# Patient Record
(demographics unavailable — no encounter records)

---

## 2024-10-23 NOTE — ASSESSMENT
[FreeTextEntry1] : In summary, Ms. JANAK FERNANDEZ is a 64-year-old postmenopausal female with stage IA (T1a, Nx, Mx) ER positive, MN positive, HER-2/neida negative invasive ductal carcinoma of the left breast. She is status post lumpectomy, ODX 0, RT completed, AI started 3/2024.   1. Breast Cancer- Ms. JANAK FERNANDEZ is tolerating AI well, good compliance. She has mild side effects from the treatment. Continue treatment x 5 yrs. Annual breast imaging reviewed: Last Diagnostic Mammogram and Bilateral Complete Breast US completed on 23 September 2024; BI-RADS 2.  2. Concern for worsening bone density and fractures due to Anastrozole. Last DEXA Bone Density Screening was completed approximately 4 years ago, with results unknown to the patient. She does report a past history of taking oral medication for the results, unsure of the name of the medication. Discussed possible need for additional intervention based on test results; patient amenable. Patient is currently taking both Calcium and Vitamin D. Encouraged weight bearing exercises as tolerated; patient walks 3-4 miles daily.  3.  High cholesterol/CAD risk factors: Concern for worsening cholesterol/CAD risk factors due to Anastrozole.  Lipid profile annually. Life-style modifications d/w her.  	 RTC 6 m

## 2024-10-23 NOTE — HISTORY OF PRESENT ILLNESS
[de-identified] : Ms. JANAK FERNANDEZ is a 63 year old female here for an evaluation of breast cancer. Her oncologic history is as follows:  Bilateral mammogram 9/26/23- left breast focal asymmetry (BIRADS 0) Diagnostic left mammogram/sonogram 10/3/2023- left breast 2:00 N+7 4 mm nodule for which ultrasound-guided biopsy is recommended (BIRADS 4). Bx (10/12/23): A. 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 has a contrast allergy as well)  Surgical pathology (12/4/23): 1. Breast, left, 2 o'clock, lumpectomy: - Invasive moderately differentiated duct carcinoma, 4 mm (microscopic measurement). - Ductal carcinoma in situ, intermediate nuclear grade, cribriform type, also involving an intraductal papilloma, with focal extension to lobules. - Margins of the specimen, free of tumor. - No lymphovascular invasion identified. - Focal lobular carcinoma in situ, classic type. - Fibrocystic changes. - Prior biopsy site changes. - Unremarkable skin. - ER+/ID+/HER2 negative by [facility] 2. Breast, left, superior margin, excision:- Fibroadipose tissue, negative for carcinoma. 3. Breast, left, medial margin, excision:- Fatty breast tissue, negative for carcinoma. 4. Breast, left, inferior margin, excision:- Fibroadipose tissue, negative for carcinoma. 5. Breast, left, lateral margin, excision:- Adipose tissue, negative for carcinoma. 6. Breast, left, deep margin, excision:- Skeletal muscle, negative for carcinoma. 7. Breast, left, anterior margin, excision:- Fibroadipose tissue, negative for carcinoma.   Her PMH includes HLD and anxiety/depression that started when her mother was diagnosed with dementia. She has had episodes of anaphylaxis in the past while traveling so she always travels with prednisone and epinephrine pen. She has been evaluated in the past and the offending agent has never been identified. Last episode occurred 3 years ago while traveling.  PSH includes excision of a dermoid cyst and salpingectomy. She underwent 3 cycles of IVF in the past. She has twin boys. 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 Allergies to contrast and shellfish. No prior breast issues. No family history of breast cancer.  ODX 0   1/19/24: Takes AI daily, good compliance. She denies aches/pain, hot flashes, vag dryness, excessive fatigue, GI s/e, hair loss. She is active, no change in energy, wt or appetite.  mammogram -  9/2023, [facility] for next year DEXA-  baseline pending  [de-identified] : In summary, Ms. JANAK FERNANDEZ is a 64-year-old postmenopausal female with stage IA (T1a, Nx, Mx) ER positive, MO positive, HER-2/neida negative invasive ductal carcinoma of the left breast. She is status post lumpectomy, ODX: 0, RT completed 3/2024, AI 3/2024.   10/23/24: Patient continues to take Anastrozole with good daily efficacy. Patient denies any excessive side effects consistent with: arthralgias, hot flashes, vaginal dryness, hair thinning, GI s/e's, SOB, excessive fatigue and sleep or mood disturbances. Anastrozole renewed today to pharmacy on file. She is walking 3-4 miles each day. Cooking for her family now that she is retired as a . She has twins-- one of which just graduated from [employer] as a Psychiatrist. He is now living in [place] practicing locally. Her other son, works for [employer] as a  given he is bilingual.  Breast Health: Last Diagnostic Mammogram and Bilateral Complete Breast US completed on 23 September 2024; BI-RADS 2.  Bone Health: Last DEXA Bone Density Screening was completed approximately 4 years ago, with results unknown to the patient. She does report a past history of taking oral medication for the results, unsure of the name of the medication. Discussed possible need for additional intervention based on test results; patient amenable. Patient is currently taking both Calcium and Vitamin D. Encouraged weight bearing exercises as tolerated; patient walks 3-4 miles daily.  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 RTC: 6 Months

## 2024-10-23 NOTE — ASSESSMENT
[FreeTextEntry1] : In summary, Ms. JANAK FERNANDEZ is a 64-year-old postmenopausal female with stage IA (T1a, Nx, Mx) ER positive, VA positive, HER-2/neida negative invasive ductal carcinoma of the left breast. She is status post lumpectomy, ODX 0, RT completed, AI started 3/2024.   1. Breast Cancer- Ms. JANAK FERNANDEZ is tolerating AI well, good compliance. She has mild side effects from the treatment. Continue treatment x 5 yrs. Annual breast imaging reviewed: Last Diagnostic Mammogram and Bilateral Complete Breast US completed on 23 September 2024; BI-RADS 2.  2. Concern for worsening bone density and fractures due to Anastrozole. Last DEXA Bone Density Screening was completed approximately 4 years ago, with results unknown to the patient. She does report a past history of taking oral medication for the results, unsure of the name of the medication. Discussed possible need for additional intervention based on test results; patient amenable. Patient is currently taking both Calcium and Vitamin D. Encouraged weight bearing exercises as tolerated; patient walks 3-4 miles daily.  3.  High cholesterol/CAD risk factors: Concern for worsening cholesterol/CAD risk factors due to Anastrozole.  Lipid profile annually. Life-style modifications d/w her.  	 RTC 6 m

## 2024-10-23 NOTE — HISTORY OF PRESENT ILLNESS
[de-identified] : Ms. JANAK FERNANDEZ is a 63 year old female here for an evaluation of breast cancer. Her oncologic history is as follows:  Bilateral mammogram 9/26/23- left breast focal asymmetry (BIRADS 0) Diagnostic left mammogram/sonogram 10/3/2023- left breast 2:00 N+7 4 mm nodule for which ultrasound-guided biopsy is recommended (BIRADS 4). Bx (10/12/23): A. 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 has a contrast allergy as well)  Surgical pathology (12/4/23): 1. Breast, left, 2 o'clock, lumpectomy: - Invasive moderately differentiated duct carcinoma, 4 mm (microscopic measurement). - Ductal carcinoma in situ, intermediate nuclear grade, cribriform type, also involving an intraductal papilloma, with focal extension to lobules. - Margins of the specimen, free of tumor. - No lymphovascular invasion identified. - Focal lobular carcinoma in situ, classic type. - Fibrocystic changes. - Prior biopsy site changes. - Unremarkable skin. - ER+/KS+/HER2 negative by [facility] 2. Breast, left, superior margin, excision:- Fibroadipose tissue, negative for carcinoma. 3. Breast, left, medial margin, excision:- Fatty breast tissue, negative for carcinoma. 4. Breast, left, inferior margin, excision:- Fibroadipose tissue, negative for carcinoma. 5. Breast, left, lateral margin, excision:- Adipose tissue, negative for carcinoma. 6. Breast, left, deep margin, excision:- Skeletal muscle, negative for carcinoma. 7. Breast, left, anterior margin, excision:- Fibroadipose tissue, negative for carcinoma.   Her PMH includes HLD and anxiety/depression that started when her mother was diagnosed with dementia. She has had episodes of anaphylaxis in the past while traveling so she always travels with prednisone and epinephrine pen. She has been evaluated in the past and the offending agent has never been identified. Last episode occurred 3 years ago while traveling.  PSH includes excision of a dermoid cyst and salpingectomy. She underwent 3 cycles of IVF in the past. She has twin boys. 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 Allergies to contrast and shellfish. No prior breast issues. No family history of breast cancer.  ODX 0   1/19/24: Takes AI daily, good compliance. She denies aches/pain, hot flashes, vag dryness, excessive fatigue, GI s/e, hair loss. She is active, no change in energy, wt or appetite.  mammogram -  9/2023, [facility] for next year DEXA-  baseline pending  [de-identified] : In summary, Ms. JANAK FERNANDEZ is a 64-year-old postmenopausal female with stage IA (T1a, Nx, Mx) ER positive, MN positive, HER-2/neida negative invasive ductal carcinoma of the left breast. She is status post lumpectomy, ODX: 0, RT completed 3/2024, AI 3/2024.   10/23/24: Patient continues to take Anastrozole with good daily efficacy. Patient denies any excessive side effects consistent with: arthralgias, hot flashes, vaginal dryness, hair thinning, GI s/e's, SOB, excessive fatigue and sleep or mood disturbances. Anastrozole renewed today to pharmacy on file. She is walking 3-4 miles each day. Cooking for her family now that she is retired as a . She has twins-- one of which just graduated from [employer] as a Psychiatrist. He is now living in [place] practicing locally. Her other son, works for [employer] as a  given he is bilingual.  Breast Health: Last Diagnostic Mammogram and Bilateral Complete Breast US completed on 23 September 2024; BI-RADS 2.  Bone Health: Last DEXA Bone Density Screening was completed approximately 4 years ago, with results unknown to the patient. She does report a past history of taking oral medication for the results, unsure of the name of the medication. Discussed possible need for additional intervention based on test results; patient amenable. Patient is currently taking both Calcium and Vitamin D. Encouraged weight bearing exercises as tolerated; patient walks 3-4 miles daily.  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 RTC: 6 Months

## 2025-01-31 NOTE — PHYSICAL EXAM
[Normal] : supple, no neck mass and thyroid not enlarged [Normal Neck Lymph Nodes] : normal neck lymph nodes  [Normal Supraclavicular Lymph Nodes] : normal supraclavicular lymph nodes [Normal Groin Lymph Nodes] : normal groin lymph nodes [Normal Axillary Lymph Nodes] : normal axillary lymph nodes [Normal] : oriented to person, place and time, with appropriate affect [de-identified] : left breast lumpectomy scars well healed w/ post op radiation changes.

## 2025-01-31 NOTE — HISTORY OF PRESENT ILLNESS
[de-identified] : Patient is a 65 y/o female who presents a f/u visit. She is s/p left breast lumpectomy for left breast papillary carcinoma at least DCIS ER+/OK+ on 12/4/23.  She completed radiation therapy on 2/17/2024 w/ Dr. Duenas.  She is currently on Anastrozole as per Dr. Mclean of med-onc, tolerating well.   MMG/US 9/23/24- No mammographic or sonographic evidence of malignancy. Postsurgical and post radiation changes in the left breast. (JAYJAY Grade 0, BIRADS 2)  Surgical pathology (12/4/23): 1. Breast, left, 2 o'clock, lumpectomy: - Invasive moderately differentiated duct carcinoma, 4 mm (microscopic measurement). - Ductal carcinoma in situ, intermediate nuclear grade, cribriform type, also involving an intraductal papilloma, with focal extension to lobules. - Margins of the specimen, free of tumor.   - No lymphovascular invasion identified. - Focal lobular carcinoma in situ, classic type. - Fibrocystic changes. - Prior biopsy site changes. - Unremarkable skin. - ER+/OK+/HER2 negative by [facility]  2. Breast, left, superior margin, excision:- Fibroadipose tissue, negative for carcinoma. 3. Breast, left, medial margin, excision:- Fatty breast tissue, negative for carcinoma. 4. Breast, left, inferior margin, excision:- Fibroadipose tissue, negative for carcinoma. 5. Breast, left, lateral margin, excision:- Adipose tissue, negative for carcinoma. 6. Breast, left, deep margin, excision:- Skeletal muscle, negative for carcinoma. 7. Breast, left, anterior margin, excision:- Fibroadipose tissue, negative for carcinoma.  Breast MRI not indicated due to fatty replaced breast tissue (pt. has a contrast allergy as well)    Bx (10/12/23): A. 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   Bilateral mammogram 9/26/23- left breast focal asymmetry (BIRADS 0)  Her PMH includes HLD and anxiety/depression that started when her mother was diagnosed with dementia.  She has had episodes of anaphylaxis in the past while traveling so she always travels with prednisone and epinephrine pen.  She has been evaluated in the past and the offending agent has never been identified.   Last episode occurred 3 years ago while traveling.    PSH includes excision of a dermoid cyst and salpingectomy.  She underwent 3 cycles of IVF in the past .  She has twin boys.  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      Allergies to contrast and shellfish.  No prior breast issues.  No family history of breast cancer.

## 2025-01-31 NOTE — ASSESSMENT
[FreeTextEntry1] : Left breast papillary carcinoma at least DCIS ER+/NE+  S/p left breast lumpectomy - path upstaged with a small focus of invasive carcinoma, margins free  Completed XRT BIRADS 2 imaging 9/2024 Continue Anastrozole as per med-onc RTO 4 months

## 2025-01-31 NOTE — CONSULT LETTER
[Dear  ___] : Dear  [unfilled], [Consult Letter:] : I had the pleasure of evaluating your patient, [unfilled]. [Please see my note below.] : Please see my note below. [Sincerely,] : Sincerely, [FreeTextEntry3] : Ramiro Kendrick MD FACS

## 2025-02-05 NOTE — HISTORY OF PRESENT ILLNESS
[de-identified] : Patient is a 63 y/o female who presents a f/u visit. She is s/p left breast lumpectomy for left breast papillary carcinoma at least DCIS ER+/RI+ on 12/4/23.  She completed radiation therapy on 2/17/2024 w/ Dr. Duenas.  She is currently on Anastrozole as per Dr. Mclean of med-onc, tolerating well.   MMG/US 9/23/24- No mammographic or sonographic evidence of malignancy. Postsurgical and post radiation changes in the left breast. (JAYJAY Grade 0, BIRADS 2)  Surgical pathology (12/4/23): 1. Breast, left, 2 o'clock, lumpectomy: - Invasive moderately differentiated duct carcinoma, 4 mm (microscopic measurement). - Ductal carcinoma in situ, intermediate nuclear grade, cribriform type, also involving an intraductal papilloma, with focal extension to lobules. - Margins of the specimen, free of tumor.   - No lymphovascular invasion identified. - Focal lobular carcinoma in situ, classic type. - Fibrocystic changes. - Prior biopsy site changes. - Unremarkable skin. - ER+/RI+/HER2 negative by [facility]  2. Breast, left, superior margin, excision:- Fibroadipose tissue, negative for carcinoma. 3. Breast, left, medial margin, excision:- Fatty breast tissue, negative for carcinoma. 4. Breast, left, inferior margin, excision:- Fibroadipose tissue, negative for carcinoma. 5. Breast, left, lateral margin, excision:- Adipose tissue, negative for carcinoma. 6. Breast, left, deep margin, excision:- Skeletal muscle, negative for carcinoma. 7. Breast, left, anterior margin, excision:- Fibroadipose tissue, negative for carcinoma.  Breast MRI not indicated due to fatty replaced breast tissue (pt. has a contrast allergy as well)    Bx (10/12/23): A. 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   Bilateral mammogram 9/26/23- left breast focal asymmetry (BIRADS 0)  Her PMH includes HLD and anxiety/depression that started when her mother was diagnosed with dementia.  She has had episodes of anaphylaxis in the past while traveling so she always travels with prednisone and epinephrine pen.  She has been evaluated in the past and the offending agent has never been identified.   Last episode occurred 3 years ago while traveling.    PSH includes excision of a dermoid cyst and salpingectomy.  She underwent 3 cycles of IVF in the past .  She has twin boys.  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      Allergies to contrast and shellfish.  No prior breast issues.  No family history of breast cancer.

## 2025-02-05 NOTE — ASSESSMENT
[FreeTextEntry1] : Left breast papillary carcinoma at least DCIS ER+/HI+  S/p left breast lumpectomy - path upstaged with a small focus of invasive carcinoma, margins free  Completed XRT BIRADS 2 imaging 9/2024 Continue Anastrozole as per med-onc PLAN:  b/l mammo/sono 9/2025 RTO after imaging

## 2025-02-05 NOTE — ASSESSMENT
[FreeTextEntry1] : Left breast papillary carcinoma at least DCIS ER+/LA+  S/p left breast lumpectomy - path upstaged with a small focus of invasive carcinoma, margins free  Completed XRT BIRADS 2 imaging 9/2024 Continue Anastrozole as per med-onc PLAN:  b/l mammo/sono 9/2025 RTO after imaging

## 2025-02-05 NOTE — HISTORY OF PRESENT ILLNESS
[de-identified] : Patient is a 65 y/o female who presents a f/u visit. She is s/p left breast lumpectomy for left breast papillary carcinoma at least DCIS ER+/MA+ on 12/4/23.  She completed radiation therapy on 2/17/2024 w/ Dr. Duenas.  She is currently on Anastrozole as per Dr. Mclean of med-onc, tolerating well.   MMG/US 9/23/24- No mammographic or sonographic evidence of malignancy. Postsurgical and post radiation changes in the left breast. (JAYJAY Grade 0, BIRADS 2)  Surgical pathology (12/4/23): 1. Breast, left, 2 o'clock, lumpectomy: - Invasive moderately differentiated duct carcinoma, 4 mm (microscopic measurement). - Ductal carcinoma in situ, intermediate nuclear grade, cribriform type, also involving an intraductal papilloma, with focal extension to lobules. - Margins of the specimen, free of tumor.   - No lymphovascular invasion identified. - Focal lobular carcinoma in situ, classic type. - Fibrocystic changes. - Prior biopsy site changes. - Unremarkable skin. - ER+/MA+/HER2 negative by [facility]  2. Breast, left, superior margin, excision:- Fibroadipose tissue, negative for carcinoma. 3. Breast, left, medial margin, excision:- Fatty breast tissue, negative for carcinoma. 4. Breast, left, inferior margin, excision:- Fibroadipose tissue, negative for carcinoma. 5. Breast, left, lateral margin, excision:- Adipose tissue, negative for carcinoma. 6. Breast, left, deep margin, excision:- Skeletal muscle, negative for carcinoma. 7. Breast, left, anterior margin, excision:- Fibroadipose tissue, negative for carcinoma.  Breast MRI not indicated due to fatty replaced breast tissue (pt. has a contrast allergy as well)    Bx (10/12/23): A. 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   Bilateral mammogram 9/26/23- left breast focal asymmetry (BIRADS 0)  Her PMH includes HLD and anxiety/depression that started when her mother was diagnosed with dementia.  She has had episodes of anaphylaxis in the past while traveling so she always travels with prednisone and epinephrine pen.  She has been evaluated in the past and the offending agent has never been identified.   Last episode occurred 3 years ago while traveling.    PSH includes excision of a dermoid cyst and salpingectomy.  She underwent 3 cycles of IVF in the past .  She has twin boys.  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      Allergies to contrast and shellfish.  No prior breast issues.  No family history of breast cancer.

## 2025-04-28 NOTE — PHYSICAL EXAM
[Fully active, able to carry on all pre-disease performance without restriction] : Status 0 - Fully active, able to carry on all pre-disease performance without restriction [Normal] : affect appropriate [de-identified] : healed lumpectomy scar and post RT changes

## 2025-04-28 NOTE — ASSESSMENT
[FreeTextEntry1] : In summary, Ms. JANAK FERNANDEZ is a 64-year-old postmenopausal female with stage IA (T1a, Nx, Mx) ER positive, ID positive, HER-2/neida negative invasive ductal carcinoma of the left breast. She is status post lumpectomy, ODX 0, RT completed, AI started 3/2024.   1. Breast Cancer- Ms. JANAK FERNANDEZ is tolerating AI well, good compliance. She is experiencing more fatigue (worsening) from January. She also endorses some insomnia, arthralgia and dizziness. The patient is unsure if the s/s she is experiencing are r/t the Anastrozole. Discussed a 3-week AI holiday to evaluate if the s/s are relieved by the break. Patient agreeable to the plan. Patient continues to deny other s/s to include:  hot flashes, vaginal dryness, arthralgias (other than in her hands), hair thinning, SOB or mood disturbances. Continue treatment x 5 yrs. Annual breast imaging reviewed: Last Diagnostic Mammogram and Bilateral Complete Breast US completed on 23 September 2024; BI-RADS 2.  2. Concern for worsening bone density and fractures due to Anastrozole. Last DEXA Bone Density screening completed on 1/17/25; Osteopenia (-2.4) noted in the Femoral Neck. Patient is currently taking both Calcium and Vitamin D. Encouraged weight bearing exercises as tolerated; patient walks 3-4 miles daily.  3.  High cholesterol/CAD risk factors: Concern for worsening cholesterol/CAD risk factors due to Anastrozole.  Lipid profile annually. Life-style modifications d/w her.  RTC: Q3 Weeks after completion of AI holiday to evaluate for resolution of side effects

## 2025-04-28 NOTE — HISTORY OF PRESENT ILLNESS
[T: ___] : T[unfilled] [N: ___] : N[unfilled] [M: ___] : M[unfilled] [AJCC Stage: ____] : AJCC Stage: [unfilled] [de-identified] : Ms. JANAK FERNANDEZ is a 63 year old female here for an evaluation of breast cancer. Her oncologic history is as follows:  Bilateral mammogram 9/26/23- left breast focal asymmetry (BIRADS 0) Diagnostic left mammogram/sonogram 10/3/2023- left breast 2:00 N+7 4 mm nodule for which ultrasound-guided biopsy is recommended (BIRADS 4). Bx (10/12/23): A. 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 has a contrast allergy as well)  Surgical pathology (12/4/23): 1. Breast, left, 2 o'clock, lumpectomy: - Invasive moderately differentiated duct carcinoma, 4 mm (microscopic measurement). - Ductal carcinoma in situ, intermediate nuclear grade, cribriform type, also involving an intraductal papilloma, with focal extension to lobules. - Margins of the specimen, free of tumor. - No lymphovascular invasion identified. - Focal lobular carcinoma in situ, classic type. - Fibrocystic changes. - Prior biopsy site changes. - Unremarkable skin. - ER+/MI+/HER2 negative by [facility] 2. Breast, left, superior margin, excision:- Fibroadipose tissue, negative for carcinoma. 3. Breast, left, medial margin, excision:- Fatty breast tissue, negative for carcinoma. 4. Breast, left, inferior margin, excision:- Fibroadipose tissue, negative for carcinoma. 5. Breast, left, lateral margin, excision:- Adipose tissue, negative for carcinoma. 6. Breast, left, deep margin, excision:- Skeletal muscle, negative for carcinoma. 7. Breast, left, anterior margin, excision:- Fibroadipose tissue, negative for carcinoma.   Her PMH includes HLD and anxiety/depression that started when her mother was diagnosed with dementia. She has had episodes of anaphylaxis in the past while traveling so she always travels with prednisone and epinephrine pen. She has been evaluated in the past and the offending agent has never been identified. Last episode occurred 3 years ago while traveling.  PSH includes excision of a dermoid cyst and salpingectomy. She underwent 3 cycles of IVF in the past. She has twin boys. 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 Allergies to contrast and shellfish. No prior breast issues. No family history of breast cancer.  ODX 0   1/19/24: Takes AI daily, good compliance. She denies aches/pain, hot flashes, vag dryness, excessive fatigue, GI s/e, hair loss. She is active, no change in energy, wt or appetite.  mammogram -  9/2023, leobardo for next year DEXA-  baseline pending    10/23/24: Patient continues to take Anastrozole with good daily efficacy. Patient denies any excessive side effects consistent with: arthralgias, hot flashes, vaginal dryness, hair thinning, GI s/e's, SOB, excessive fatigue and sleep or mood disturbances. Anastrozole renewed today to pharmacy on file. She is walking 3-4 miles each day. Cooking for her family now that she is retired as a . She has twins-- one of which just graduated from [employer] as a Psychiatrist. He is now living in [place] practicing locally. Her other son, works for [employer] as a  given he is bilingual.  Breast Health: Last Diagnostic Mammogram and Bilateral Complete Breast US completed on 23 September 2024; BI-RADS 2.  Bone Health: Last DEXA Bone Density Screening was completed approximately 4 years ago, with results unknown to the patient. She does report a past history of taking oral medication for the results, unsure of the name of the medication. Discussed possible need for additional intervention based on test results; patient amenable. Patient is currently taking both Calcium and Vitamin D. Encouraged weight bearing exercises as tolerated; patient walks 3-4 miles daily.  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 RTC: 6 Months [de-identified] : In summary, Ms. JANAK FERNANDEZ is a 64-year-old postmenopausal female with stage IA (T1a, Nx, Mx) ER positive, OH positive, HER-2/neida negative invasive ductal carcinoma of the left breast. She is status post lumpectomy, ODX: 0, RT completed 3/2024, AI 3/2024.   10/23/24: Patient continues to take Anastrozole with good daily efficacy. Patient denies any excessive side effects consistent with: arthralgias, hot flashes, vaginal dryness, hair thinning, GI s/e's, SOB, excessive fatigue and sleep or mood disturbances. Anastrozole renewed today to pharmacy on file. She is walking 3-4 miles each day. Cooking for her family now that she is retired as a . She has twins-- one of which just graduated from [employer] as a Psychiatrist. He is now living in [place] practicing locally. Her other son, works for [employer] as a  given he is bilingual.  Breast Health: Last Diagnostic Mammogram and Bilateral Complete Breast US completed on 23 September 2024; BI-RADS 2.  Bone Health: Last DEXA Bone Density Screening was completed approximately 4 years ago, with results unknown to the patient. She does report a past history of taking oral medication for the results, unsure of the name of the medication. Discussed possible need for additional intervention based on test results; patient amenable. Patient is currently taking both Calcium and Vitamin D. Encouraged weight bearing exercises as tolerated; patient walks 3-4 miles daily.  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 RTC: 6 Months  4/23/2025: She is experiencing more fatigue (worsening) from January. She also endorses some insomnia, arthralgia and dizziness. The patient is unsure if the s/s she is experiencing are r/t the Anastrozole. Discussed a 3-week AI holiday to evaluate if the s/s are relieved by the break. Patient agreeable to the plan. Patient continues to deny other s/s to include:  hot flashes, vaginal dryness, arthralgias (other than in her hands), hair thinning, SOB or mood disturbances. UTD with Neurology; was last seen 4/22/25. She is experiencing hand spasms, with one considerable episode in January. She is utilizing copper gloves, Votaren gel and completing the recommended exercises. She has also seen a hand surgeon who told her she had no structural issues and had the hands of a 21-year-old. Neurology is sending her for some additional testing to include an MRI of the Spine.  She is scheduled for a tooth extraction tomorrow and is meeting her new PCP (previous one retired) all in the same day.  Breast Health: Last Diagnostic Mammogram and Bilateral Complete Breast US completed on 23 September 2024; BI-RADS 2.  Bone Health: last DEXA Bone Density screening completed on 1/17/25; Osteopenia (-2.4) noted in the Femoral Neck. Patient is currently taking both Calcium and Vitamin D. Encouraged weight bearing exercises as tolerated; patient walks 3-4 miles daily.   last DEXA Bone Density screening completed on 1/17/25; Osteopenia (-2.4) noted in the Femoral Neck. Patient is currently taking both Calcium and Vitamin D. Encouraged weight bearing exercises as tolerated; patient walks 3-4 miles daily.  RTC: Q3 Weeks after completion of AI holiday to evaluate for resolution of side effects

## 2025-04-28 NOTE — PHYSICAL EXAM
[Fully active, able to carry on all pre-disease performance without restriction] : Status 0 - Fully active, able to carry on all pre-disease performance without restriction [Normal] : affect appropriate [de-identified] : healed lumpectomy scar and post RT changes

## 2025-04-28 NOTE — ASSESSMENT
[FreeTextEntry1] : In summary, Ms. JANAK FERNANDEZ is a 64-year-old postmenopausal female with stage IA (T1a, Nx, Mx) ER positive, NC positive, HER-2/neida negative invasive ductal carcinoma of the left breast. She is status post lumpectomy, ODX 0, RT completed, AI started 3/2024.   1. Breast Cancer- Ms. JANAK FERNANDEZ is tolerating AI well, good compliance. She is experiencing more fatigue (worsening) from January. She also endorses some insomnia, arthralgia and dizziness. The patient is unsure if the s/s she is experiencing are r/t the Anastrozole. Discussed a 3-week AI holiday to evaluate if the s/s are relieved by the break. Patient agreeable to the plan. Patient continues to deny other s/s to include:  hot flashes, vaginal dryness, arthralgias (other than in her hands), hair thinning, SOB or mood disturbances. Continue treatment x 5 yrs. Annual breast imaging reviewed: Last Diagnostic Mammogram and Bilateral Complete Breast US completed on 23 September 2024; BI-RADS 2.  2. Concern for worsening bone density and fractures due to Anastrozole. Last DEXA Bone Density screening completed on 1/17/25; Osteopenia (-2.4) noted in the Femoral Neck. Patient is currently taking both Calcium and Vitamin D. Encouraged weight bearing exercises as tolerated; patient walks 3-4 miles daily.  3.  High cholesterol/CAD risk factors: Concern for worsening cholesterol/CAD risk factors due to Anastrozole.  Lipid profile annually. Life-style modifications d/w her.  RTC: Q3 Weeks after completion of AI holiday to evaluate for resolution of side effects

## 2025-04-28 NOTE — HISTORY OF PRESENT ILLNESS
[T: ___] : T[unfilled] [N: ___] : N[unfilled] [M: ___] : M[unfilled] [AJCC Stage: ____] : AJCC Stage: [unfilled] [de-identified] : Ms. JANAK FERNANDEZ is a 63 year old female here for an evaluation of breast cancer. Her oncologic history is as follows:  Bilateral mammogram 9/26/23- left breast focal asymmetry (BIRADS 0) Diagnostic left mammogram/sonogram 10/3/2023- left breast 2:00 N+7 4 mm nodule for which ultrasound-guided biopsy is recommended (BIRADS 4). Bx (10/12/23): A. 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 has a contrast allergy as well)  Surgical pathology (12/4/23): 1. Breast, left, 2 o'clock, lumpectomy: - Invasive moderately differentiated duct carcinoma, 4 mm (microscopic measurement). - Ductal carcinoma in situ, intermediate nuclear grade, cribriform type, also involving an intraductal papilloma, with focal extension to lobules. - Margins of the specimen, free of tumor. - No lymphovascular invasion identified. - Focal lobular carcinoma in situ, classic type. - Fibrocystic changes. - Prior biopsy site changes. - Unremarkable skin. - ER+/TN+/HER2 negative by [facility] 2. Breast, left, superior margin, excision:- Fibroadipose tissue, negative for carcinoma. 3. Breast, left, medial margin, excision:- Fatty breast tissue, negative for carcinoma. 4. Breast, left, inferior margin, excision:- Fibroadipose tissue, negative for carcinoma. 5. Breast, left, lateral margin, excision:- Adipose tissue, negative for carcinoma. 6. Breast, left, deep margin, excision:- Skeletal muscle, negative for carcinoma. 7. Breast, left, anterior margin, excision:- Fibroadipose tissue, negative for carcinoma.   Her PMH includes HLD and anxiety/depression that started when her mother was diagnosed with dementia. She has had episodes of anaphylaxis in the past while traveling so she always travels with prednisone and epinephrine pen. She has been evaluated in the past and the offending agent has never been identified. Last episode occurred 3 years ago while traveling.  PSH includes excision of a dermoid cyst and salpingectomy. She underwent 3 cycles of IVF in the past. She has twin boys. 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 Allergies to contrast and shellfish. No prior breast issues. No family history of breast cancer.  ODX 0   1/19/24: Takes AI daily, good compliance. She denies aches/pain, hot flashes, vag dryness, excessive fatigue, GI s/e, hair loss. She is active, no change in energy, wt or appetite.  mammogram -  9/2023, le[facility]ardo for next year DEXA-  baseline pending    10/23/24: Patient continues to take Anastrozole with good daily efficacy. Patient denies any excessive side effects consistent with: arthralgias, hot flashes, vaginal dryness, hair thinning, GI s/e's, SOB, excessive fatigue and sleep or mood disturbances. Anastrozole renewed today to pharmacy on file. She is walking 3-4 miles each day. Cooking for her family now that she is retired as a . She has twins-- one of which just graduated from [employer] as a Psychiatrist. He is now living in [place] practicing locally. Her other son, works for "[employer] as a  given he is bilingual.  Breast Health: Last Diagnostic Mammogram and Bilateral Complete Breast US completed on 23 September 2024; BI-RADS 2.  Bone Health: Last DEXA Bone Density Screening was completed approximately 4 years ago, with results unknown to the patient. She does report a past history of taking oral medication for the results, unsure of the name of the medication. Discussed possible need for additional intervention based on test results; patient amenable. Patient is currently taking both Calcium and Vitamin D. Encouraged weight bearing exercises as tolerated; patient walks 3-4 miles daily.  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 RTC: 6 Months [de-identified] : In summary, Ms. JANAK FERNANDEZ is a 64-year-old postmenopausal female with stage IA (T1a, Nx, Mx) ER positive, NM positive, HER-2/neida negative invasive ductal carcinoma of the left breast. She is status post lumpectomy, ODX: 0, RT completed 3/2024, AI 3/2024.   10/23/24: Patient continues to take Anastrozole with good daily efficacy. Patient denies any excessive side effects consistent with: arthralgias, hot flashes, vaginal dryness, hair thinning, GI s/e's, SOB, excessive fatigue and sleep or mood disturbances. Anastrozole renewed today to pharmacy on file. She is walking 3-4 miles each day. Cooking for her family now that she is retired as a . She has twins-- one of which just graduated from [employer] as a Psychiatrist. He is now living in [place] practicing locally. Her other son, works for [employer] as a  given he is bilingual.  Breast Health: Last Diagnostic Mammogram and Bilateral Complete Breast US completed on 23 September 2024; BI-RADS 2.  Bone Health: Last DEXA Bone Density Screening was completed approximately 4 years ago, with results unknown to the patient. She does report a past history of taking oral medication for the results, unsure of the name of the medication. Discussed possible need for additional intervention based on test results; patient amenable. Patient is currently taking both Calcium and Vitamin D. Encouraged weight bearing exercises as tolerated; patient walks 3-4 miles daily.  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 RTC: 6 Months  4/23/2025: She is experiencing more fatigue (worsening) from January. She also endorses some insomnia, arthralgia and dizziness. The patient is unsure if the s/s she is experiencing are r/t the Anastrozole. Discussed a 3-week AI holiday to evaluate if the s/s are relieved by the break. Patient agreeable to the plan. Patient continues to deny other s/s to include:  hot flashes, vaginal dryness, arthralgias (other than in her hands), hair thinning, SOB or mood disturbances. UTD with Neurology; was last seen 4/22/25. She is experiencing hand spasms, with one considerable episode in January. She is utilizing copper gloves, Votaren gel and completing the recommended exercises. She has also seen a hand surgeon who told her she had no structural issues and had the hands of a 21-year-old. Neurology is sending her for some additional testing to include an MRI of the Spine.  She is scheduled for a tooth extraction tomorrow and is meeting her new PCP (previous one retired) all in the same day.  Breast Health: Last Diagnostic Mammogram and Bilateral Complete Breast US completed on 23 September 2024; BI-RADS 2.  Bone Health: last DEXA Bone Density screening completed on 1/17/25; Osteopenia (-2.4) noted in the Femoral Neck. Patient is currently taking both Calcium and Vitamin D. Encouraged weight bearing exercises as tolerated; patient walks 3-4 miles daily.   last DEXA Bone Density screening completed on 1/17/25; Osteopenia (-2.4) noted in the Femoral Neck. Patient is currently taking both Calcium and Vitamin D. Encouraged weight bearing exercises as tolerated; patient walks 3-4 miles daily.  RTC: Q3 Weeks after completion of AI holiday to evaluate for resolution of side effects

## 2025-05-14 NOTE — PHYSICAL EXAM
[Fully active, able to carry on all pre-disease performance without restriction] : Status 0 - Fully active, able to carry on all pre-disease performance without restriction [Normal] : affect appropriate [de-identified] : healed lumpectomy scar and post RT changes

## 2025-05-14 NOTE — PHYSICAL EXAM
[Fully active, able to carry on all pre-disease performance without restriction] : Status 0 - Fully active, able to carry on all pre-disease performance without restriction [Normal] : affect appropriate [de-identified] : healed lumpectomy scar and post RT changes

## 2025-05-14 NOTE — HISTORY OF PRESENT ILLNESS
[T: ___] : T[unfilled] [N: ___] : N[unfilled] [M: ___] : M[unfilled] [AJCC Stage: ____] : AJCC Stage: [unfilled] [de-identified] : Ms. JANAK FERNANDEZ is a 63 year old female here for an evaluation of breast cancer. Her oncologic history is as follows:  Bilateral mammogram 9/26/23- left breast focal asymmetry (BIRADS 0) Diagnostic left mammogram/sonogram 10/3/2023- left breast 2:00 N+7 4 mm nodule for which ultrasound-guided biopsy is recommended (BIRADS 4). Bx (10/12/23): A. 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 has a contrast allergy as well)  Surgical pathology (12/4/23): 1. Breast, left, 2 o'clock, lumpectomy: - Invasive moderately differentiated duct carcinoma, 4 mm (microscopic measurement). - Ductal carcinoma in situ, intermediate nuclear grade, cribriform type, also involving an intraductal papilloma, with focal extension to lobules. - Margins of the specimen, free of tumor. - No lymphovascular invasion identified. - Focal lobular carcinoma in situ, classic type. - Fibrocystic changes. - Prior biopsy site changes. - Unremarkable skin. - ER+/KS+/HER2 negative by [facility] 2. Breast, left, superior margin, excision:- Fibroadipose tissue, negative for carcinoma. 3. Breast, left, medial margin, excision:- Fatty breast tissue, negative for carcinoma. 4. Breast, left, inferior margin, excision:- Fibroadipose tissue, negative for carcinoma. 5. Breast, left, lateral margin, excision:- Adipose tissue, negative for carcinoma. 6. Breast, left, deep margin, excision:- Skeletal muscle, negative for carcinoma. 7. Breast, left, anterior margin, excision:- Fibroadipose tissue, negative for carcinoma.   Her PMH includes HLD and anxiety/depression that started when her mother was diagnosed with dementia. She has had episodes of anaphylaxis in the past while traveling so she always travels with prednisone and epinephrine pen. She has been evaluated in the past and the offending agent has never been identified. Last episode occurred 3 years ago while traveling.  PSH includes excision of a dermoid cyst and salpingectomy. She underwent 3 cycles of IVF in the past. She has twin boys. 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 Allergies to contrast and shellfish. No prior breast issues. No family history of breast cancer.  ODX 0   1/19/24: Takes AI daily, good compliance. She denies aches/pain, hot flashes, vag dryness, excessive fatigue, GI s/e, hair loss. She is active, no change in energy, wt or appetite.  mammogram -  9/2023, leobardo for next year DEXA-  baseline pending    10/23/24: Patient continues to take Anastrozole with good daily efficacy. Patient denies any excessive side effects consistent with: arthralgias, hot flashes, vaginal dryness, hair thinning, GI s/e's, SOB, excessive fatigue and sleep or mood disturbances. Anastrozole renewed today to pharmacy on file. She is walking 3-4 miles each day. Cooking for her family now that she is retired as a . She has twins-- one of which just graduated from [email or web address] as a Psychiatrist. He is now living in [place] practicing locally. Her other son, works for [employer] as a  given he is bilingual.  Breast Health: Last Diagnostic Mammogram and Bilateral Complete Breast US completed on 23 September 2024; BI-RADS 2.  Bone Health: Last DEXA Bone Density Screening was completed approximately 4 years ago, with results unknown to the patient. She does report a past history of taking oral medication for the results, unsure of the name of the medication. Discussed possible need for additional intervention based on test results; patient amenable. Patient is currently taking both Calcium and Vitamin D. Encouraged weight bearing exercises as tolerated; patient walks 3-4 miles daily.  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 RTC: 6 Months  4/23/2025: She is experiencing more fatigue (worsening) from January. She also endorses some insomnia, arthralgia and dizziness. The patient is unsure if the s/s she is experiencing are r/t the Anastrozole. Discussed a 3-week AI holiday to evaluate if the s/s are relieved by the break. Patient agreeable to the plan. Patient continues to deny other s/s to include:  hot flashes, vaginal dryness, arthralgias (other than in her hands), hair thinning, SOB or mood disturbances. UTD with Neurology; was last seen 4/22/25. She is experiencing hand spasms, with one considerable episode in January. She is utilizing copper gloves, Votaren gel and completing the recommended exercises. She has also seen a hand surgeon who told her she had no structural issues and had the hands of a 21-year-old. Neurology is sending her for some additional testing to include an MRI of the Spine.  She is scheduled for a tooth extraction tomorrow and is meeting her new PCP (previous one retired) all in the same day.  Breast Health: Last Diagnostic Mammogram and Bilateral Complete Breast US completed on 23 September 2024; BI-RADS 2.  Bone Health: last DEXA Bone Density screening completed on 1/17/25; Osteopenia (-2.4) noted in the Femoral Neck. Patient is currently taking both Calcium and Vitamin D. Encouraged weight bearing exercises as tolerated; patient walks 3-4 miles daily.   last DEXA Bone Density screening completed on 1/17/25; Osteopenia (-2.4) noted in the Femoral Neck. Patient is currently taking both Calcium and Vitamin D. Encouraged weight bearing exercises as tolerated; patient walks 3-4 miles daily.  RTC: Q3 Weeks after completion of AI holiday to evaluate for resolution of side effects  [de-identified] : In summary, Ms. JANAK FERNANDEZ is a 64-year-old postmenopausal female with stage IA (T1a, Nx, Mx) ER positive, TN positive, HER-2/neida negative invasive ductal carcinoma of the left breast. She is status post lumpectomy, ODX: 0, RT completed 3/2024, AI 3/2024. Exe 5/2025 5/14/2025: Patient presents today after 3 week Medication Holiday from Anastrozole. Previously, the patient reported experiencing hand spasms, body aches and insomnia. She has some improvement in her sx but still has achiness/insomnia. She is on lexapro. Will add gabapentin 300 HS for insomnia and aches. Switch to exemestane Bone scan d/w her but she wishes to hold off. MRI spine w/o con PATTI Breast Health: Last Diagnostic Mammogram and Bilateral Complete Breast US completed on 23 September 2024; BI-RADS 2.  Bone Health: dexa 1/2025 osteopenia

## 2025-05-14 NOTE — ASSESSMENT
[FreeTextEntry1] : In summary, Ms. JANAK FERNANDEZ is a 64-year-old postmenopausal female with stage IA (T1a, Nx, Mx) ER positive, NV positive, HER-2/neida negative invasive ductal carcinoma of the left breast. She is status post lumpectomy, ODX 0, RT completed, AI started 3/2024.   1. Breast Cancer- Ms. JANAK FERNANDEZ  5/14/2025: Patient presents today after 3 week Medication Holiday from Anastrozole. Previously, the patient reported experiencing hand spasms, body aches and insomnia. She has some improvement in her sx but still has achiness/insomnia. She is on lexapro. Will add gabapentin 300 HS for insomnia and aches. Switch to exemestane Bone scan d/w her but she wishes to hold off. MRI spine w/o con PATTI Breast Health: Last Diagnostic Mammogram and Bilateral Complete Breast US completed on 23 September 2024; BI-RADS 2.  Continue treatment x 5 yrs. Annual breast imaging reviewed: Last Diagnostic Mammogram and Bilateral Complete Breast US completed on 23 September 2024; BI-RADS 2.  2. Concern for worsening bone density and fractures due to Anastrozole. Last DEXA Bone Density screening completed on 1/17/25; Osteopenia (-2.4) noted in the Femoral Neck. Patient is currently taking both Calcium and Vitamin D. Encouraged weight bearing exercises as tolerated; patient walks 3-4 miles daily.  3.  High cholesterol/CAD risk factors: Concern for worsening cholesterol/CAD risk factors due to Anastrozole.  Lipid profile annually. Life-style modifications d/w her.  RTC: Q3 months

## 2025-05-14 NOTE — PHYSICAL EXAM
[Fully active, able to carry on all pre-disease performance without restriction] : Status 0 - Fully active, able to carry on all pre-disease performance without restriction [Normal] : affect appropriate [de-identified] : healed lumpectomy scar and post RT changes

## 2025-05-14 NOTE — ASSESSMENT
[FreeTextEntry1] : In summary, Ms. JNAAK FERNANDEZ is a 64-year-old postmenopausal female with stage IA (T1a, Nx, Mx) ER positive, [place] positive, HER-2/neida negative invasive ductal carcinoma of the left breast. She is status post lumpectomy, ODX 0, RT completed, AI started 3/2024.   1. Breast Cancer- Ms. JANAK FERNANDEZ  5/14/2025: Patient presents today after 3 week Medication Holiday from Anastrozole. Previously, the patient reported experiencing hand spasms, body aches and insomnia. She has some improvement in her sx but still has achiness/insomnia. She is on lexapro. Will add gabapentin 300 HS for insomnia and aches. Switch to exemestane Bone scan d/w her but she wishes to hold off. MRI spine w/o con PATTI Breast Health: Last Diagnostic Mammogram and Bilateral Complete Breast US completed on 23 September 2024; BI-RADS 2.  Continue treatment x 5 yrs. Annual breast imaging reviewed: Last Diagnostic Mammogram and Bilateral Complete Breast US completed on 23 September 2024; BI-RADS 2.  2. Concern for worsening bone density and fractures due to Anastrozole. Last DEXA Bone Density screening completed on 1/17/25; Osteopenia (-2.4) noted in the Femoral Neck. Patient is currently taking both Calcium and Vitamin D. Encouraged weight bearing exercises as tolerated; patient walks 3-4 miles daily.  3.  High cholesterol/CAD risk factors: Concern for worsening cholesterol/CAD risk factors due to Anastrozole.  Lipid profile annually. Life-style modifications d/w her.  RTC: Q3 months

## 2025-05-14 NOTE — HISTORY OF PRESENT ILLNESS
[T: ___] : T[unfilled] [N: ___] : N[unfilled] [M: ___] : M[unfilled] [AJCC Stage: ____] : AJCC Stage: [unfilled] [de-identified] : Ms. JANAK FERNANDEZ is a 63 year old female here for an evaluation of breast cancer. Her oncologic history is as follows:  Bilateral mammogram 9/26/23- left breast focal asymmetry (BIRADS 0) Diagnostic left mammogram/sonogram 10/3/2023- left breast 2:00 N+7 4 mm nodule for which ultrasound-guided biopsy is recommended (BIRADS 4). Bx (10/12/23): A. 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 has a contrast allergy as well)  Surgical pathology (12/4/23): 1. Breast, left, 2 o'clock, lumpectomy: - Invasive moderately differentiated duct carcinoma, 4 mm (microscopic measurement). - Ductal carcinoma in situ, intermediate nuclear grade, cribriform type, also involving an intraductal papilloma, with focal extension to lobules. - Margins of the specimen, free of tumor. - No lymphovascular invasion identified. - Focal lobular carcinoma in situ, classic type. - Fibrocystic changes. - Prior biopsy site changes. - Unremarkable skin. - ER+/HI+/HER2 negative by [facility] 2. Breast, left, superior margin, excision:- Fibroadipose tissue, negative for carcinoma. 3. Breast, left, medial margin, excision:- Fatty breast tissue, negative for carcinoma. 4. Breast, left, inferior margin, excision:- Fibroadipose tissue, negative for carcinoma. 5. Breast, left, lateral margin, excision:- Adipose tissue, negative for carcinoma. 6. Breast, left, deep margin, excision:- Skeletal muscle, negative for carcinoma. 7. Breast, left, anterior margin, excision:- Fibroadipose tissue, negative for carcinoma.   Her PMH includes HLD and anxiety/depression that started when her mother was diagnosed with dementia. She has had episodes of anaphylaxis in the past while traveling so she always travels with prednisone and epinephrine pen. She has been evaluated in the past and the offending agent has never been identified. Last episode occurred 3 years ago while traveling.  PSH includes excision of a dermoid cyst and salpingectomy. She underwent 3 cycles of IVF in the past. She has twin boys. 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 Allergies to contrast and shellfish. No prior breast issues. No family history of breast cancer.  ODX 0   1/19/24: Takes AI daily, good compliance. She denies aches/pain, hot flashes, vag dryness, excessive fatigue, GI s/e, hair loss. She is active, no change in energy, wt or appetite.  mammogram -  9/2023, [facility] for next year DEXA-  baseline pending    10/23/24: Patient continues to take Anastrozole with good daily efficacy. Patient denies any excessive side effects consistent with: arthralgias, hot flashes, vaginal dryness, hair thinning, GI s/e's, SOB, excessive fatigue and sleep or mood disturbances. Anastrozole renewed today to pharmacy on file. She is walking 3-4 miles each day. Cooking for her family now that she is retired as a . She has twins-- one of which just graduated from [employer] as a Psychiatrist. He is now living in [place] practicing locally. Her other son, works for [employer] as a  given he is bilingual.  Breast Health: Last Diagnostic Mammogram and Bilateral Complete Breast US completed on 23 September 2024; BI-RADS 2.  Bone Health: Last DEXA Bone Density Screening was completed approximately 4 years ago, with results unknown to the patient. She does report a past history of taking oral medication for the results, unsure of the name of the medication. Discussed possible need for additional intervention based on test results; patient amenable. Patient is currently taking both Calcium and Vitamin D. Encouraged weight bearing exercises as tolerated; patient walks 3-4 miles daily.  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 RTC: 6 Months  4/23/2025: She is experiencing more fatigue (worsening) from January. She also endorses some insomnia, arthralgia and dizziness. The patient is unsure if the s/s she is experiencing are r/t the Anastrozole. Discussed a 3-week AI holiday to evaluate if the s/s are relieved by the break. Patient agreeable to the plan. Patient continues to deny other s/s to include:  hot flashes, vaginal dryness, arthralgias (other than in her hands), hair thinning, SOB or mood disturbances. UTD with Neurology; was last seen 4/22/25. She is experiencing hand spasms, with one considerable episode in January. She is utilizing copper gloves, Votaren gel and completing the recommended exercises. She has also seen a hand surgeon who told her she had no structural issues and had the hands of a 21-year-old. Neurology is sending her for some additional testing to include an MRI of the Spine.  She is scheduled for a tooth extraction tomorrow and is meeting her new PCP (previous one retired) all in the same day.  Breast Health: Last Diagnostic Mammogram and Bilateral Complete Breast US completed on 23 September 2024; BI-RADS 2.  Bone Health: last DEXA Bone Density screening completed on 1/17/25; Osteopenia (-2.4) noted in the Femoral Neck. Patient is currently taking both Calcium and Vitamin D. Encouraged weight bearing exercises as tolerated; patient walks 3-4 miles daily.   last DEXA Bone Density screening completed on 1/17/25; Osteopenia (-2.4) noted in the Femoral Neck. Patient is currently taking both Calcium and Vitamin D. Encouraged weight bearing exercises as tolerated; patient walks 3-4 miles daily.  RTC: Q3 Weeks after completion of AI holiday to evaluate for resolution of side effects  [de-identified] : In summary, Ms. JANAK FERNANDEZ is a 64-year-old postmenopausal female with stage IA (T1a, Nx, Mx) ER positive, [place] positive, HER-2/neida negative invasive ductal carcinoma of the left breast. She is status post lumpectomy, ODX: 0, RT completed 3/2024, AI 3/2024. Exe 5/2025 5/14/2025: Patient presents today after 3 week Medication Holiday from Anastrozole. Previously, the patient reported experiencing hand spasms, body aches and insomnia. She has some improvement in her sx but still has achiness/insomnia. She is on lexapro. Will add gabapentin 300 HS for insomnia and aches. Switch to exemestane Bone scan d/w her but she wishes to hold off. MRI spine w/o con PATTI Breast Health: Last Diagnostic Mammogram and Bilateral Complete Breast US completed on 23 September 2024; BI-RADS 2.  Bone Health: dexa 1/2025 osteopenia

## 2025-05-14 NOTE — HISTORY OF PRESENT ILLNESS
[T: ___] : T[unfilled] [N: ___] : N[unfilled] [M: ___] : M[unfilled] [AJCC Stage: ____] : AJCC Stage: [unfilled] [de-identified] : Ms. JANAK FERNANDEZ is a 63 year old female here for an evaluation of breast cancer. Her oncologic history is as follows:  Bilateral mammogram 9/26/23- left breast focal asymmetry (BIRADS 0) Diagnostic left mammogram/sonogram 10/3/2023- left breast 2:00 N+7 4 mm nodule for which ultrasound-guided biopsy is recommended (BIRADS 4). Bx (10/12/23): A. 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 has a contrast allergy as well)  Surgical pathology (12/4/23): 1. Breast, left, 2 o'clock, lumpectomy: - Invasive moderately differentiated duct carcinoma, 4 mm (microscopic measurement). - Ductal carcinoma in situ, intermediate nuclear grade, cribriform type, also involving an intraductal papilloma, with focal extension to lobules. - Margins of the specimen, free of tumor. - No lymphovascular invasion identified. - Focal lobular carcinoma in situ, classic type. - Fibrocystic changes. - Prior biopsy site changes. - Unremarkable skin. - ER+/ID+/HER2 negative by [facility] 2. Breast, left, superior margin, excision:- Fibroadipose tissue, negative for carcinoma. 3. Breast, left, medial margin, excision:- Fatty breast tissue, negative for carcinoma. 4. Breast, left, inferior margin, excision:- Fibroadipose tissue, negative for carcinoma. 5. Breast, left, lateral margin, excision:- Adipose tissue, negative for carcinoma. 6. Breast, left, deep margin, excision:- Skeletal muscle, negative for carcinoma. 7. Breast, left, anterior margin, excision:- Fibroadipose tissue, negative for carcinoma.   Her PMH includes HLD and anxiety/depression that started when her mother was diagnosed with dementia. She has had episodes of anaphylaxis in the past while traveling so she always travels with prednisone and epinephrine pen. She has been evaluated in the past and the offending agent has never been identified. Last episode occurred 3 years ago while traveling.  PSH includes excision of a dermoid cyst and salpingectomy. She underwent 3 cycles of IVF in the past. She has twin boys. 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 Allergies to contrast and shellfish. No prior breast issues. No family history of breast cancer.  ODX 0   1/19/24: Takes AI daily, good compliance. She denies aches/pain, hot flashes, vag dryness, excessive fatigue, GI s/e, hair loss. She is active, no change in energy, wt or appetite.  mammogram -  9/2023, [facility] for next year DEXA-  baseline pending    10/23/24: Patient continues to take Anastrozole with good daily efficacy. Patient denies any excessive side effects consistent with: arthralgias, hot flashes, vaginal dryness, hair thinning, GI s/e's, SOB, excessive fatigue and sleep or mood disturbances. Anastrozole renewed today to pharmacy on file. She is walking 3-4 miles each day. Cooking for her family now that she is retired as a . She has twins-- one of which just graduated from [employer] as a Psychiatrist. He is now living in [place] practicing locally. Her other son, works for [employer] as a  given he is bilingual.  Breast Health: Last Diagnostic Mammogram and Bilateral Complete Breast US completed on 23 September 2024; BI-RADS 2.  Bone Health: Last DEXA Bone Density Screening was completed approximately 4 years ago, with results unknown to the patient. She does report a past history of taking oral medication for the results, unsure of the name of the medication. Discussed possible need for additional intervention based on test results; patient amenable. Patient is currently taking both Calcium and Vitamin D. Encouraged weight bearing exercises as tolerated; patient walks 3-4 miles daily.  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 RTC: 6 Months  4/23/2025: She is experiencing more fatigue (worsening) from January. She also endorses some insomnia, arthralgia and dizziness. The patient is unsure if the s/s she is experiencing are r/t the Anastrozole. Discussed a 3-week AI holiday to evaluate if the s/s are relieved by the break. Patient agreeable to the plan. Patient continues to deny other s/s to include:  hot flashes, vaginal dryness, arthralgias (other than in her hands), hair thinning, SOB or mood disturbances. UTD with Neurology; was last seen 4/22/25. She is experiencing hand spasms, with one considerable episode in January. She is utilizing copper gloves, Votaren gel and completing the recommended exercises. She has also seen a hand surgeon who told her she had no structural issues and had the hands of a 21-year-old. Neurology is sending her for some additional testing to include an MRI of the Spine.  She is scheduled for a tooth extraction tomorrow and is meeting her new PCP (previous one retired) all in the same day.  Breast Health: Last Diagnostic Mammogram and Bilateral Complete Breast US completed on 23 September 2024; BI-RADS 2.  Bone Health: last DEXA Bone Density screening completed on 1/17/25; Osteopenia (-2.4) noted in the Femoral Neck. Patient is currently taking both Calcium and Vitamin D. Encouraged weight bearing exercises as tolerated; patient walks 3-4 miles daily.   last DEXA Bone Density screening completed on 1/17/25; Osteopenia (-2.4) noted in the Femoral Neck. Patient is currently taking both Calcium and Vitamin D. Encouraged weight bearing exercises as tolerated; patient walks 3-4 miles daily.  RTC: Q3 Weeks after completion of AI holiday to evaluate for resolution of side effects  [de-identified] : In summary, Ms. JANAK FERNANDEZ is a 64-year-old postmenopausal female with stage IA (T1a, Nx, Mx) ER positive, [place] positive, HER-2/neida negative invasive ductal carcinoma of the left breast. She is status post lumpectomy, ODX: 0, RT completed 3/2024, AI 3/2024. Exe 5/2025 5/14/2025: Patient presents today after 3 week Medication Holiday from Anastrozole. Previously, the patient reported experiencing hand spasms, body aches and insomnia. She has some improvement in her sx but still has achiness/insomnia. She is on lexapro. Will add gabapentin 300 HS for insomnia and aches. Switch to exemestane Bone scan d/w her but she wishes to hold off. MRI spine w/o con PATTI Breast Health: Last Diagnostic Mammogram and Bilateral Complete Breast US completed on 23 September 2024; BI-RADS 2.  Bone Health: dexa 1/2025 osteopenia

## 2025-05-14 NOTE — ASSESSMENT
[FreeTextEntry1] : In summary, Ms. JANAK FERNANDEZ is a 64-year-old postmenopausal female with stage IA (T1a, Nx, Mx) ER positive, SC positive, HER-2/neida negative invasive ductal carcinoma of the left breast. She is status post lumpectomy, ODX 0, RT completed, AI started 3/2024.   1. Breast Cancer- Ms. JANAK FERNANDEZ  5/14/2025: Patient presents today after 3 week Medication Holiday from Anastrozole. Previously, the patient reported experiencing hand spasms, body aches and insomnia. She has some improvement in her sx but still has achiness/insomnia. She is on lexapro. Will add gabapentin 300 HS for insomnia and aches. Switch to exemestane Bone scan d/w her but she wishes to hold off. MRI spine w/o con PATTI Breast Health: Last Diagnostic Mammogram and Bilateral Complete Breast US completed on 23 September 2024; BI-RADS 2.  Continue treatment x 5 yrs. Annual breast imaging reviewed: Last Diagnostic Mammogram and Bilateral Complete Breast US completed on 23 September 2024; BI-RADS 2.  2. Concern for worsening bone density and fractures due to Anastrozole. Last DEXA Bone Density screening completed on 1/17/25; Osteopenia (-2.4) noted in the Femoral Neck. Patient is currently taking both Calcium and Vitamin D. Encouraged weight bearing exercises as tolerated; patient walks 3-4 miles daily.  3.  High cholesterol/CAD risk factors: Concern for worsening cholesterol/CAD risk factors due to Anastrozole.  Lipid profile annually. Life-style modifications d/w her.  RTC: Q3 months